# Patient Record
(demographics unavailable — no encounter records)

---

## 2024-10-23 NOTE — ADDENDUM
[FreeTextEntry1] : I, Amarjit Kaplan, acted solely as a scribe for Dr. Amarjit Scott on this date 10/23/2024  .   All medical record entries made by the Scribe were at my, Dr. Amarjit Scott, direction and personally dictated by me on 10/23/2024 . I have reviewed the chart and agree that the record accurately reflects my personal performance of the history, physical exam, assessment and plan. I have also personally directed, reviewed, and agreed with the chart.

## 2024-10-23 NOTE — HISTORY OF PRESENT ILLNESS
[FreeTextEntry1] :  10/23/2024: TREVOR INFANTE is a 62 year old female presenting to the office for a follow up evaluation of a left foot fifth metatarsal fracture and right midfoot arthritis. She injured her right foot in May and it has been bothering her more recently.  The patient presents to the office in sneakers and ambulating without assistance.   The patient is a 62-year-old female who presents with a left foot fifth metatarsal fracture and right midfoot arthritis.  She states that the beginning of August she twisted her left foot and was diagnosed with a base of fifth metatarsal fracture.  The patient has MS stating that she cannot feel her feet.  She does walk abnormally.  She is in a long cam boot for her left foot.  No other complaints.

## 2024-10-23 NOTE — DISCUSSION/SUMMARY
[de-identified] :  Today I had a lengthy discussion with the patient regarding their left foot fifth metatarsal fracture and right midfoot arthritis pain. I have addressed all the patient's concerns surrounding the pathology of their condition.  I have reviewed the patient's XR imaging with them in great detail.  At this time I would like to obtain advanced imaging of the patient's right anterior process. An MRI was ordered so I can find out more about the etiology of the patient's condition. The patient should follow up with the office after obtaining the MRI. I recommend that the patient utilize ice, NSAIDS PRN, and heat. They can also elevate their RLE and LLE above the level of the heart.  A discussion was had about shoe-wear modifications. I advised the patient to utilize a wide toed cross training sneaker that better accommodates the feet. I recommended New Balance, [employer], or [employer] to the patient.  The patient understood and verbally agreed to the treatment plan. All of their questions were answered and they were satisfied with the visit. The patient should call the office if they have any questions or experience worsening symptoms.  FU after advanced imaging is obtained

## 2024-10-23 NOTE — PHYSICAL EXAM
[de-identified] : Left foot Physical Examination:  General: Alert and oriented x3.  In no acute distress.  Pleasant in nature with a normal affect.  No apparent respiratory distress.  Erythema, Warmth, Rubor: Negative Swelling: Mild swelling present at the base of the fifth metatarsal.  anterior process no pain left base of 5th   ROM Ankle: 1. Dorsiflexion: 10 degrees 2. Plantarflexion: 40 degrees 3. Inversion: 30 degrees 4. Eversion: 20 degrees  ROM of digits: Normal  Pes Planus: Negative Pes Cavus: Negative  Bunion: Positive Tailor's Bunion (Bunionette): Negative Hammer Toe Deformity/Deformities: Positive  Tenderness to Palpation:  1. Heel Pain: Negative 2. Midfoot Pain: Negative 3. First MTP Joint: Negative 4. Lis Franc Joint: Negative  Tenderness Metatarsals: 1st MT: Negative 2nd MT: Negative 3rd MT: Negative 4th MT: Negative 5th MT: Negative Base of the 5th MT: Positive  Ligament Pain: 1. Lis Franc Ligament: Negative 2. Plantar Fascia Ligament: Negative  Strength:  5/5 TA/GS/EHL/FHL/EDL/ADD/ABD  Pulses: 2+ DP/PT Pulses  Capillary Refill Toes: <2 seconds  Neuro: Intact motor and sensory throughout  Additional Test: 1. Gomez's Squeeze Test: Negative 2. Calcaneal Squeeze Test: Negative   Right foot Physical Examination:  General: Alert and oriented x3.  In no acute distress.  Pleasant in nature with a normal affect.  No apparent respiratory distress.  Erythema, Warmth, Rubor: Negative Swelling: Negative  ROM Ankle: 1. Dorsiflexion: 10 degrees 2. Plantarflexion: 40 degrees 3. Inversion: 30 degrees 4. Eversion: 20 degrees  ROM of digits: Normal  Pes Planus: Negative Pes Cavus: Negative  Bunion: Positive Tailor's Bunion (Bunionette): Negative Hammer Toe Deformity/Deformities: Positive  Tenderness to Palpation:  1. Heel Pain: Negative 2. Midfoot Pain: Positive 3. First MTP Joint: Negative 4. Lis Franc Joint: Negative  Tenderness Metatarsals: 1st MT: Negative 2nd MT: Negative 3rd MT: Negative 4th MT: Negative 5th MT: Negative Base of the 5th MT: Negative  Ligament Pain: 1. Lis Franc Ligament: Negative 2. Plantar Fascia Ligament: Negative  Strength:  5/5 TA/GS/EHL/FHL/EDL/ADD/ABD  Pulses: 2+ DP/PT Pulses  Capillary Refill Toes: <2 seconds  Neuro: Intact motor and sensory throughout  Additional Test: 1. Gomez's Squeeze Test: Negative 2. Calcaneal Squeeze Test: Negative [de-identified] : Bilateral foot x-rays reviewed, 6 views total, 10/23/2024: Small chip fracture coming of the base of the fifth metatarsal left foot stable.  Hammertoe deformities.  Bunion deformities.  Midfoot arthritic changes seen.  No other fractures present.

## 2025-01-22 NOTE — HISTORY OF PRESENT ILLNESS
[FreeTextEntry1] : 1/22/2025: The patient is a 62-year-old female who presents with right ankle and right midfoot pain.  MRI from November 2024 does show degenerative changes in the midfoot where the patient is having the pain.  No new trauma associated with the pain.  The patient did suffer a fall which led to a left foot fifth metatarsal fracture, patient was last seen for this in October 2024.  She is not having any pain in the left foot or ankle, more concerned about her right ankle and midfoot.  Patient has no other complaints.  Patient wearing unsupportive boots, walking without assistance.  10/23/2024: TREVOR INFANTE is a 62-year-old female presenting to the office for a follow up evaluation of a left foot fifth metatarsal fracture and right midfoot arthritis. She injured her right foot in May, and it has been bothering her more recently.  The patient presents to the office in sneakers and ambulating without assistance.  9/11/2024: The patient is a 62-year-old female who presents with a left foot fifth metatarsal fracture and right midfoot arthritis.  She states that the beginning of August she twisted her left foot and was diagnosed with a base of fifth metatarsal fracture.  The patient has MS stating that she cannot feel her feet.  She does walk abnormally.  She is in a long cam boot for her left foot.  No other complaints.

## 2025-01-22 NOTE — PHYSICAL EXAM
[de-identified] : Right ankle Physical Examination:  General: Alert and oriented x3.  In no acute distress.  Pleasant in nature with a normal affect.  No apparent respiratory distress.  Erythema, Warmth, Rubor: Negative Swelling: Negative  ROM: 1. Dorsiflexion: 10 degrees 2. Plantarflexion: 40 degrees 3. Inversion: 30 degrees 4. Eversion: 20 degrees 5. Subtalar: 10 degrees  Tenderness to Palpation:  1. Lateral Malleolus: Negative 2. Medial Malleolus: Negative 3. Proximal Fibular Pain: Negative 4. Heel Pain: Negative 5. Cuboid: Negative 6. Navicular: Negative 7. Tibiotalar Joint: + 8. Subtalar Joint: Negative 9. Posterior Recess: Negative  Tendon Pain: 1. Achilles: Negative 2. Peroneals: Negative 3. Posterior Tibialis: Negative 4. Tibialis Anterior: Negative  Ligament Pain: 1. ATFL: Negative 2. CFL: Negative  3. PTFL: Negative 4. Deltoid Ligaments: Negative 5. Lis Franc Ligament: Negative  Stability:  1. Anterior Drawer: Negative 2. Posterior Drawer: Negative  Strength: 5/5 TA/GS/EHL  Pulses: 2+ DP/PT Pulses  Neuro: Intact motor and sensory  Additional Test: 1. Calcaneal Squeeze Test: Negative 2. Syndesmosis Squeeze Test: Negative   Right foot Physical Examination:  General: Alert and oriented x3.  In no acute distress.  Pleasant in nature with a normal affect.  No apparent respiratory distress.  Erythema, Warmth, Rubor: Negative Swelling: Negative  ROM Ankle: 1. Dorsiflexion: 10 degrees 2. Plantarflexion: 40 degrees 3. Inversion: 30 degrees 4. Eversion: 20 degrees  ROM of digits: Normal  Pes Planus: Negative Pes Cavus: Negative  Bunion: Negative Tailor's Bunion (Bunionette): Negative Hammer Toe Deformity/Deformities: Negative  Tenderness to Palpation:  1. Heel Pain: Negative 2. Midfoot Pain: + 3. First MTP Joint: Negative 4. Lis Franc Joint: Negative  Tenderness Metatarsals: 1st MT: Negative 2nd MT: Negative 3rd MT: Negative 4th MT: Negative 5th MT: Negative Base of the 5th MT: Negative  Ligament Pain: 1. Lis Franc Ligament: Negative 2. Plantar Fascia Ligament: Negative  Strength:  5/5 TA/GS/EHL/FHL/EDL/ADD/ABD  Pulses: 2+ DP/PT Pulses  Capillary Refill Toes: <2 seconds  Neuro: Intact motor and sensory throughout  Additional Test: 1. Gomez's Squeeze Test: Negative 2. Calcaneal Squeeze Test: Negative   Left foot Physical Examination:  General: Alert and oriented x3.  In no acute distress.  Pleasant in nature with a normal affect.  No apparent respiratory distress.  Erythema, Warmth, Rubor: Negative Swelling: Negative  ROM Ankle: 1. Dorsiflexion: 10 degrees 2. Plantarflexion: 40 degrees 3. Inversion: 30 degrees 4. Eversion: 20 degrees  ROM of digits: Normal  Pes Planus: Negative Pes Cavus: Negative  Bunion: Negative Tailor's Bunion (Bunionette): Negative Hammer Toe Deformity/Deformities: Negative  Tenderness to Palpation:  1. Heel Pain: Negative 2. Midfoot Pain: Negative 3. First MTP Joint: Negative 4. Lis Franc Joint: Negative  Tenderness Metatarsals: 1st MT: Negative 2nd MT: Negative 3rd MT: Negative 4th MT: Negative 5th MT: Negative Base of the 5th MT: Negative  Ligament Pain: 1. Lis Franc Ligament: Negative 2. Plantar Fascia Ligament: Negative  Strength:  5/5 TA/GS/EHL/FHL/EDL/ADD/ABD  Pulses: 2+ DP/PT Pulses  Capillary Refill Toes: <2 seconds  Neuro: Intact motor and sensory throughout  Additional Test: 1. Gomez's Squeeze Test: Negative 2. Calcaneal Squeeze Test: Negative [de-identified] : Previous bilateral foot x-rays reviewed, 6 views total, 1/22/2025: Small chip fracture coming of the base of the fifth metatarsal left foot stable.  Hammertoe deformities.  Bunion deformities.  Midfoot arthritic changes seen.  No other fractures present.  3 views x-rays right ankle reviewed and taken on 1/22/2025: Midfoot arthritic changes seen.  No fractures in the ankle.  	 EXAM: [id number] - MR FOOT RT  - ORDERED BY:  YUDELKA WEEMS   PROCEDURE DATE:  11/06/2024    INTERPRETATION:  EXAMINATION: MRI of the right foot without contrast  CLINICAL INFORMATION: Right foot pain after fall  TECHNIQUE: Multiplanar, multisequential MR imaging was performed. Imaging was performed through the ankle, hindfoot, and midfoot.  FINDINGS: There is no fracture or osteonecrosis.  There are tarsometatarsal osteoarthritic changes with marginating cystic change and marrow edema, most pronounced at the second tarsometatarsal articulation. There is associated mucoid degeneration of the Lisfranc ligament with adjacent enthesopathic fibrocystic changes and edema in the second metatarsal base and distal aspect of the medial cuneiform. Prominent subchondral marrow edema is also present within the intermediate cuneiform, marginating the third tarsometatarsal articulation, and within the distal cuboid. There is also arthrosis at the articulation between the navicular and medial cuneiform with marginating subchondral edema  There are no acute ankle ligamentous injuries. The syndesmotic ligaments are intact. There are no acute tendon or muscle tears. There is a small amount of fluid within the peroneal and posterior tibialis tendon sheaths. There is moderate atrophy of the intrinsic foot musculature and of the abductor digiti minimi and abductor hallucis muscles. There is diffuse subcutaneous soft tissue edema about the midfoot.  There is thickening of the central cord of the plantar fascia with a plantar calcaneal enthesophyte and perifascial edema. There is also reactive marrow edema in the plantar aspect of the calcaneus. Findings are consistent with acute on chronic plantar fasciitis There is no plantar fascial rupture.  IMPRESSION: Midfoot and tarsometatarsal degenerative changes prominent marginating cystic change and marrow edema in several bones, as detailed above.  Acute on chronic plantar fasciitis, as above.  --- End of Report ---       TORRES MCELROY MD; Attending Radiologist This document has been electronically signed. Nov 8 2024  1:56PM

## 2025-01-22 NOTE — DISCUSSION/SUMMARY
[de-identified] : The MRI and the x-rays were reviewed with the patient.  All questions answered about the MRI findings.  Patient will continue with conservative management and observation.  No invasive treatments are warranted at this time.  Continue with proper shoe open good arch supports.  Avoid walking barefoot.  Naprosyn 500 mg prescribed, use as directed, as tolerated with food for pain and swelling in her feet.  Custom orthotics prescribed for both feet.  Patient will continue with home exercises and stretches.  Patient leaving for [place].  No additional imaging is required.  Follow-up as needed.  The patient understood and verbally agreed to the treatment plan. All of their questions were answered and they were satisfied with the visit. The patient should call the office if they have any questions or experience worsening symptoms.

## 2025-04-16 NOTE — RESULTS/DATA
[Extensive counseling about Hormone Replacement Therapy. Reviewed both FDA-approved and non-FDA-approved options] : Extensive counseling about Hormone Replacement Therapy. Reviewed both FDA-approved and non-FDA-approved options such as estrogen/progesterone options, bioidentical hormones, compounded formulation, and pellets. All concerns were addressed, and questions were answered.

## 2025-04-17 NOTE — PLAN
[FreeTextEntry1] : Patient to follow up in 1 year for annual GYN exam chart requested  start estradiol, rx sent  Mammogram due: 3/26 Colonoscopy due: per anne, usually every 2 years  Bone density due: now Pap ordered Hemoccult ordered All questions answered, patient agreeable with plan    I Leydi Padilla [facility] am scribing for the presence of Dr. Cabrera the following sections HISTORY OF PRESENT ILLNESS, PAST MEDICAL/FAMILY/SOCIAL HISTORY; REVIEW OF SYSTEMS; VITAL SIGNS; PHYSICAL EXAM; DISPOSITION. I personally performed the services described in the documentation, reviewed the documentation recorded by the scribe in my presence and it accurately and completely records my words and actions.

## 2025-04-17 NOTE — HISTORY OF PRESENT ILLNESS
[FreeTextEntry1] : Patient is a 64 yo  female here today for initial visit for her annual visit. c/o VMS  hx of VDx2, ectopic x1, hysterectomy  (done by [facility]), IBS, COPD, asthma

## 2025-04-17 NOTE — PHYSICAL EXAM
[Appropriately responsive] : appropriately responsive [Alert] : alert [No Acute Distress] : no acute distress [No Lymphadenopathy] : no lymphadenopathy [Soft] : soft [Non-tender] : non-tender [Non-distended] : non-distended [No HSM] : No HSM [No Lesions] : no lesions [No Mass] : no mass [Oriented x3] : oriented x3 [Examination Of The Breasts] : a normal appearance [No Masses] : no breast masses were palpable [Labia Majora] : normal [Labia Minora] : normal [Normal] : normal [Absent] : absent [Uterine Adnexae] : normal [Normal rectal exam] : was normal [FreeTextEntry2] : Jose FNP-BC [Occult Blood Positive] : was negative for occult blood analysis

## 2025-04-17 NOTE — PLAN
[FreeTextEntry1] : Patient to follow up in 1 year for annual GYN exam chart requested  start estradiol, rx sent  Mammogram due: 3/26 Colonoscopy due: per anne, usually every 2 years  Bone density due: now Pap ordered Hemoccult ordered All questions answered, patient agreeable with plan    I Leydi Padilla [facility] am scribing for the presence of Dr. Cabrera the following sections HISTORY OF PRESENT ILLNESS, PAST MEDICAL/FAMILY/SOCIAL HISTORY; REVIEW OF SYSTEMS; VITAL SIGNS; PHYSICAL EXAM; DISPOSITION. I personally performed the services described in the documentation, reviewed the documentation recorded by the scribe in my presence and it accurately and completely records my words and actions.